# Patient Record
Sex: FEMALE | Race: WHITE
[De-identification: names, ages, dates, MRNs, and addresses within clinical notes are randomized per-mention and may not be internally consistent; named-entity substitution may affect disease eponyms.]

---

## 2019-04-03 NOTE — NUR
DAY SURGERY | HAND-OFF TO JESS SAVAGE
 
Report to Jess SAVAGE. Handoff of medications to Jess SAVAGE, including 10 mg of
Oxycontin.

## 2019-04-03 NOTE — NUR
Patient up to Ambulate independently. Gait steady. STANDBY ASSIST.
Discharge instructions reviewed with patient. Patient verbalizes understanding.
Copy given to patient to take home.
Patient States Post-Procedure ride home has been arranged.
Discharged via wheelchair to private car for ride home.
ICE/ELEVATE, KEEP DRY, PT TO TAKE BLOOD SUGAR ON ARRIVAL HOME AND ADMINISTER
SCHEDULED DOSE OF INSULIN. FAMILY TO F/U WITH PT'S PCP FOR ISSUE OF BETTER
CONTROL OF BLOOD SUGARS AND TO DISCUSS POSSIBLE SSC. PT HAS KNOW SLEEP APNEA
AND HAS PREVIOUSLY REFUSED CPAP-FAMILY TO EXPLORE DIFF CPAP DEVICES/OPTIONS
WITH PCP.

## 2019-10-17 ENCOUNTER — HOSPITAL ENCOUNTER (OUTPATIENT)
Dept: HOSPITAL 95 - ORSCSDS | Age: 69
Discharge: HOME | End: 2019-10-17
Attending: STUDENT IN AN ORGANIZED HEALTH CARE EDUCATION/TRAINING PROGRAM
Payer: COMMERCIAL

## 2019-10-17 VITALS — WEIGHT: 140.65 LBS | BODY MASS INDEX: 27.61 KG/M2 | HEIGHT: 60 IN

## 2019-10-17 DIAGNOSIS — K21.0: Primary | ICD-10-CM

## 2019-10-17 DIAGNOSIS — K29.80: ICD-10-CM

## 2019-10-17 DIAGNOSIS — K44.9: ICD-10-CM

## 2019-10-17 DIAGNOSIS — E11.9: ICD-10-CM

## 2019-10-17 DIAGNOSIS — Z79.899: ICD-10-CM

## 2019-10-17 DIAGNOSIS — R13.10: ICD-10-CM

## 2019-10-17 DIAGNOSIS — K31.7: ICD-10-CM

## 2019-10-17 DIAGNOSIS — Z79.4: ICD-10-CM

## 2019-10-17 DIAGNOSIS — J45.909: ICD-10-CM

## 2019-10-17 PROCEDURE — 0DB98ZX EXCISION OF DUODENUM, VIA NATURAL OR ARTIFICIAL OPENING ENDOSCOPIC, DIAGNOSTIC: ICD-10-PCS | Performed by: STUDENT IN AN ORGANIZED HEALTH CARE EDUCATION/TRAINING PROGRAM

## 2019-10-17 PROCEDURE — 0DB58ZX EXCISION OF ESOPHAGUS, VIA NATURAL OR ARTIFICIAL OPENING ENDOSCOPIC, DIAGNOSTIC: ICD-10-PCS | Performed by: STUDENT IN AN ORGANIZED HEALTH CARE EDUCATION/TRAINING PROGRAM

## 2019-10-17 PROCEDURE — 0DB68ZX EXCISION OF STOMACH, VIA NATURAL OR ARTIFICIAL OPENING ENDOSCOPIC, DIAGNOSTIC: ICD-10-PCS | Performed by: STUDENT IN AN ORGANIZED HEALTH CARE EDUCATION/TRAINING PROGRAM

## 2019-10-17 NOTE — NUR
10/17/19 [address]
PATIENT SNORING LOUDLY AND
 RETCHING DURING CASE, MAINTAINING O2 SATS BETWEEN 92-96% ON
5L. TOWARDS THE END OF CASE, PT DESATS DOWN TO 62%, JAW THRUST
UNSUCCESSFUL IN BRINGING UP SATURATION, REBREATHER MASK PUT ON,
ANESTHESIA CALLED IN. PT STARTS TO WAKE, SATS RISE INTO 90S, PT DENIES
PAIN, SLIGHT COUGH NOTED.
PT NEEDS ANESTHESIA SUPPORT DURING FUTURE CASES DUE TO SEVERE SLEEP
APNEA.

## 2022-02-04 ENCOUNTER — HOSPITAL ENCOUNTER (OUTPATIENT)
Dept: HOSPITAL 95 - LAB SHORT | Age: 72
End: 2022-02-04
Attending: PHYSICIAN ASSISTANT
Payer: COMMERCIAL

## 2022-02-04 DIAGNOSIS — E11.65: Primary | ICD-10-CM

## 2022-12-25 ENCOUNTER — HOSPITAL ENCOUNTER (EMERGENCY)
Dept: HOSPITAL 95 - ER | Age: 72
Discharge: HOME | End: 2022-12-25
Payer: COMMERCIAL

## 2022-12-25 VITALS — BODY MASS INDEX: 27.48 KG/M2 | HEIGHT: 60 IN | WEIGHT: 139.99 LBS

## 2022-12-25 DIAGNOSIS — E11.9: ICD-10-CM

## 2022-12-25 DIAGNOSIS — K04.7: Primary | ICD-10-CM

## 2022-12-25 DIAGNOSIS — Z79.899: ICD-10-CM

## 2022-12-25 DIAGNOSIS — Z79.4: ICD-10-CM

## 2022-12-25 PROCEDURE — A9270 NON-COVERED ITEM OR SERVICE: HCPCS
